# Patient Record
Sex: FEMALE | Race: OTHER
[De-identification: names, ages, dates, MRNs, and addresses within clinical notes are randomized per-mention and may not be internally consistent; named-entity substitution may affect disease eponyms.]

---

## 2020-08-11 ENCOUNTER — HOSPITAL ENCOUNTER (OUTPATIENT)
Dept: HOSPITAL 47 - OR | Age: 80
LOS: 1 days | Discharge: HOME HEALTH | End: 2020-08-12
Attending: ORTHOPAEDIC SURGERY
Payer: MEDICARE

## 2020-08-11 VITALS — BODY MASS INDEX: 24 KG/M2

## 2020-08-11 DIAGNOSIS — Z80.9: ICD-10-CM

## 2020-08-11 DIAGNOSIS — Z90.49: ICD-10-CM

## 2020-08-11 DIAGNOSIS — Z82.49: ICD-10-CM

## 2020-08-11 DIAGNOSIS — Z86.19: ICD-10-CM

## 2020-08-11 DIAGNOSIS — M17.12: Primary | ICD-10-CM

## 2020-08-11 DIAGNOSIS — M25.762: ICD-10-CM

## 2020-08-11 DIAGNOSIS — Z86.12: ICD-10-CM

## 2020-08-11 DIAGNOSIS — Z97.3: ICD-10-CM

## 2020-08-11 DIAGNOSIS — Z87.828: ICD-10-CM

## 2020-08-11 DIAGNOSIS — R26.81: ICD-10-CM

## 2020-08-11 DIAGNOSIS — Z83.3: ICD-10-CM

## 2020-08-11 DIAGNOSIS — Z98.890: ICD-10-CM

## 2020-08-11 DIAGNOSIS — Z90.710: ICD-10-CM

## 2020-08-11 DIAGNOSIS — H91.90: ICD-10-CM

## 2020-08-11 PROCEDURE — 97110 THERAPEUTIC EXERCISES: CPT

## 2020-08-11 PROCEDURE — 64448 NJX AA&/STRD FEM NRV NFS IMG: CPT

## 2020-08-11 PROCEDURE — 73560 X-RAY EXAM OF KNEE 1 OR 2: CPT

## 2020-08-11 PROCEDURE — 76942 ECHO GUIDE FOR BIOPSY: CPT

## 2020-08-11 PROCEDURE — 27447 TOTAL KNEE ARTHROPLASTY: CPT

## 2020-08-11 PROCEDURE — 88300 SURGICAL PATH GROSS: CPT

## 2020-08-11 PROCEDURE — 97161 PT EVAL LOW COMPLEX 20 MIN: CPT

## 2020-08-11 PROCEDURE — 85025 COMPLETE CBC W/AUTO DIFF WBC: CPT

## 2020-08-11 PROCEDURE — 80053 COMPREHEN METABOLIC PANEL: CPT

## 2020-08-11 RX ADMIN — CEFAZOLIN SCH MLS/HR: 330 INJECTION, POWDER, FOR SOLUTION INTRAMUSCULAR; INTRAVENOUS at 21:01

## 2020-08-11 RX ADMIN — POTASSIUM CHLORIDE SCH MLS: 14.9 INJECTION, SOLUTION INTRAVENOUS at 14:10

## 2020-08-11 RX ADMIN — ASPIRIN 325 MG ORAL TABLET SCH MG: 325 PILL ORAL at 21:01

## 2020-08-11 RX ADMIN — POTASSIUM CHLORIDE SCH: 14.9 INJECTION, SOLUTION INTRAVENOUS at 18:13

## 2020-08-11 NOTE — XR
EXAMINATION TYPE: XR knee limited LT

 

DATE OF EXAM: 8/11/2020

 

CLINICAL HISTORY: Left knee pain and arthritis status post total knee replacement.

 

TECHNIQUE:  Portable AP and crosstable lateral views of the left knee are obtained immediately postop
eratively.

 

COMPARISON: None

 

FINDINGS:  Native osseous structures somewhat demineralized. Metallic hardware from total left knee a
rthroplasty is seen and appears satisfactory in alignment and position.  There is evidence of recent 
surgery with diffuse subcutaneous gas and soft tissue swelling noted. 

 

 

IMPRESSION:  METALLIC HARDWARE FROM TOTAL LEFT KNEE ARTHROPLASTY IS SATISFACTORY IN ALIGNMENT.

## 2020-08-11 NOTE — P.OP
Date of Procedure: 08/11/20


Preoperative Diagnosis: 


Severe osteoarthritis left knee


Postoperative Diagnosis: 


Severe osteoarthritis left knee


Procedure(s) Performed: 


Left total knee arthroplasty


Implants: 


Smith and Nephew Journey II CR Oxinium cruciate retaining femoral component size

4, left


Smith & Nephew Journey left nonporous tibial baseplate size 3


Smith [employer] Nephew Journey II, XLPE Deep Dished articular insert, size13 mm, Size 3-

4 left


[employer] Nephew Journey BCS resurfacing oval patellar component, 29 mm


All components were cemented using Palacos R bone cement..


The articulation is Oxinium on polyethylene.


Anesthesia: spinal


Surgeon: Kris Childers


Assistant #1: Linh Bhatia


Estimated Blood Loss (ml): 30


Pathology: other (Bone and cartilage)


Condition: stable


Disposition: PACU


Indications for Procedure: 


After failure of conservative treatment we discussed the surgical and 

nonsurgical treatment options at length.  Patient wishes to proceed with a total

knee arthroplasty.  Complications specific to this procedure were discussed at 

length, including but not limited to infection, bleeding, stiffness, and nerve 

injury.   Covid-19 was also discussed at length with the patient, and they are 

aware of the current policies and procedures.  The patient was given the option 

of delaying surgery, but they elect to proceed knowing these risks.  Patient is 

aware of all these complications and informed consent was obtained


Operative Findings: 


The operative findings are consistent with severe osteoarthritis of the left 

knee


Description of Procedure: 


Patient was seen in the preoperative area consent was reviewed and operative 

site was marked with a skin marker.  An adductor canal pain catheter was placed 

by anesthesia in the preoperative area.  Patient was then brought to the 

operating room and given preoperative antibiotics intravenously. A spinal 

anesthetic was administered by the anesthesia department.  A tourniquet was 

placed on the upper thigh and the lower extremity was prepped and draped in 

usual sterile fashion.  A gram of transexamic acid was given.  A universal 

timeout was then performed which confirmed the patient's name, surgical site, 

ALLERGIES, and consent.





The lower extremity was then exsanguinated and tourniquet was inflated to 250 

mmHg.  A standard and anterior midline approach to the knee was performed.  The 

skin and subcutaneous tissue was dissected down to the patellar tendon.  A 

medial parapatellar arthrotomy was then performed.  The knee was then extended, 

the patellar was everted, and the knee was again flexed.  The patellar fat pad 

was removed in order to enhance exposure.  Anterior horns of both menisci were 

excised, and a release was performed to the posterior medial aspect of the knee.

 On gross visual inspection, there was complete loss of articular cartilage in 

the medial and patellofemoral joint spaces.  There was also significant 

cartilage damage in the lateral compartment.  There were multiple periarticular 

osteophytes which were then removed with a Ronguer.  The femoral canal was then 

opened with the 9.5 mm intramedullary drill.  The 8 mm intramedullary jair was 

then inserted into the femoral canal.  The distal femoral cutting guide was then

placed and set for 5 of valgus.  The distal femoral cutting block was then 

pinned in place.  The intramedullary jair was then removed, and the distal femur 

was then cut.  The cutting block was then removed and the cut was checked for 

symmetry.  Next, the sizing guide was then placed and set for 3 external 

rotation based off of the epicondylar axis and Whitesides line.  Pins were then 

placed and the drill holes, and the femur was sized with the sizing stylus.  The

pins were then removed, and the sizing guide was then removed.  The spikes of 

the femoral block was then placed into the predrilled holes, and malleted into 

place.  Two 45 mm pins were then placed into the fixation holes on the cutting 

block.  An harry wing was then used to ensure there would be no notching with 

the anterior cut.  The anterior condyles were cut without notching.  The 

anterior cord cut was then performed, followed by the posterior cut, posterior 

chamfer cut, and the anterior chamfer cut.  The collateral ligaments were 

protected during the entire process.  The cutting block was then removed, and 

the femoral canal was plugged with autologous bone.





Attention was then directed to the tibia.  The remaining ACL was removed with a 

Ronguer, and the tibia was then gently subluxed forward with a large bent knee 

retractor.  Any remaining menisci was excised.  The posterior lateral corner was

cauterized in order to cauterize the lateral geniculate artery.  The extra 

medullary tibial cutting guide was then placed, set for the appropriate 

rotation, slope, and depth of resection.  The proximal tibia cutting guide was 

then pinned in place.  Proximal tibia was then cut and sized.  Next trials were 

then placed with the appropriate-sized insert.  The knee was able to fully 

extend and flex to 130 and was stable throughout all range of motion.  The knee

was then extended, patella everted.  Patella was then measured, and then using 

an osteotomy guide, the patella was cut at the appropriate level.  The patella 

was then measured and drilled and the patella trial was then placed.  The knee 

was then taken through range of motion with the patella trial and the patella 

tracked normally.  The knee was then extended patella trial was then removed and

the patella was everted.  Knee was then flexed and lug holes were drilled 

through the femoral trial and the femoral trial was then removed.  The tibial 

was then exposed, and the tibial broach guide was then pinned in place after it 

was set for the appropriate rotation to allow for the most coverage without 

overhang.  The tibia was then reamed and broached.





The cut surfaces of bone were then irrigated with pulsatile lavage.  The 

posterior structures were injected with the ropivacaine solution.  The knee was 

also irrigated with Irrisept solution.  The components were then opened, the 

cement was mixed, and the components were then cemented in place.  The cement 

was allowed to harden with the knee in full extension.  While the cement was 

hardening, the remaining soft tissues were then injected with a ropivacaine 

solution, which consisted of 246.25 mg of ropivacaine, 0.5 mg of epinephrine, 30

mg of Toradol, 80 g of clonidine, and 48.45 mL of sterile water, for a total of

100 mL of fluid injected. After the cemented hardened.  The tourniquet was 

released, and hemostasis was obtained.  A second gram of transexamic acid was 

given.  The knee was again irrigated.  The knee was again taken through range of

motion and found to be stable throughout all range of motion of 0-130, and the 

patella tracked normally.  The fascia was then closed with #2 strata fix suture.

 The subcutaneous tissue was closed with 3-0 Vicryl and 3-0 strata fix.  

Dermabond glue was used for the skin and placed with the knee in flexion. The p

atient was placed in a sterile silver dressing.  Patient was then transferred to

recovery room in stable condition.





The assistant SARA Roberts was required due the complexity surgery and the 

need for a skilled surgical assistant.  She assisted in positioning, draping, 

retraction, and closure of the wound.

## 2020-08-12 VITALS — SYSTOLIC BLOOD PRESSURE: 125 MMHG | TEMPERATURE: 98.7 F | RESPIRATION RATE: 16 BRPM | DIASTOLIC BLOOD PRESSURE: 67 MMHG

## 2020-08-12 VITALS — HEART RATE: 64 BPM

## 2020-08-12 LAB
ALBUMIN SERPL-MCNC: 3.8 G/DL (ref 3.5–5)
ALP SERPL-CCNC: 81 U/L (ref 38–126)
ALT SERPL-CCNC: 18 U/L (ref 4–34)
ANION GAP SERPL CALC-SCNC: 10 MMOL/L
AST SERPL-CCNC: 31 U/L (ref 14–36)
BASOPHILS # BLD AUTO: 0 K/UL (ref 0–0.2)
BASOPHILS NFR BLD AUTO: 0 %
BUN SERPL-SCNC: 10 MG/DL (ref 7–17)
CALCIUM SPEC-MCNC: 9 MG/DL (ref 8.4–10.2)
CHLORIDE SERPL-SCNC: 105 MMOL/L (ref 98–107)
CO2 SERPL-SCNC: 24 MMOL/L (ref 22–30)
EOSINOPHIL # BLD AUTO: 0 K/UL (ref 0–0.7)
EOSINOPHIL NFR BLD AUTO: 0 %
ERYTHROCYTE [DISTWIDTH] IN BLOOD BY AUTOMATED COUNT: 4.08 M/UL (ref 3.8–5.4)
ERYTHROCYTE [DISTWIDTH] IN BLOOD: 13.6 % (ref 11.5–15.5)
GLUCOSE SERPL-MCNC: 186 MG/DL (ref 74–99)
HCT VFR BLD AUTO: 38.2 % (ref 34–46)
HGB BLD-MCNC: 11.8 GM/DL (ref 11.4–16)
LYMPHOCYTES # SPEC AUTO: 1.2 K/UL (ref 1–4.8)
LYMPHOCYTES NFR SPEC AUTO: 7 %
MCH RBC QN AUTO: 29.1 PG (ref 25–35)
MCHC RBC AUTO-ENTMCNC: 31 G/DL (ref 31–37)
MCV RBC AUTO: 93.7 FL (ref 80–100)
MONOCYTES # BLD AUTO: 0.5 K/UL (ref 0–1)
MONOCYTES NFR BLD AUTO: 3 %
NEUTROPHILS # BLD AUTO: 14.4 K/UL (ref 1.3–7.7)
NEUTROPHILS NFR BLD AUTO: 89 %
PLATELET # BLD AUTO: 197 K/UL (ref 150–450)
POTASSIUM SERPL-SCNC: 4.2 MMOL/L (ref 3.5–5.1)
PROT SERPL-MCNC: 6.2 G/DL (ref 6.3–8.2)
SODIUM SERPL-SCNC: 139 MMOL/L (ref 137–145)
WBC # BLD AUTO: 16.1 K/UL (ref 3.8–10.6)

## 2020-08-12 RX ADMIN — CEFAZOLIN SCH: 330 INJECTION, POWDER, FOR SOLUTION INTRAMUSCULAR; INTRAVENOUS at 08:41

## 2020-08-12 RX ADMIN — ASPIRIN 325 MG ORAL TABLET SCH MG: 325 PILL ORAL at 08:39

## 2020-08-12 NOTE — P.PN
Subjective


Progress Note Date: 08/12/20





No new complaints today.





Objective





- Vital Signs


Vital signs: 


                                   Vital Signs











Temp  98.7 F   08/12/20 07:00


 


Pulse  64   08/12/20 07:00


 


Resp  16   08/12/20 07:00


 


BP  125/67   08/12/20 07:00


 


Pulse Ox  95   08/12/20 07:00








                                 Intake & Output











 08/11/20 08/12/20 08/12/20





 18:59 06:59 18:59


 


Intake Total 750 222 


 


Output Total 30 400 


 


Balance 720 -178 


 


Weight 55.9 kg  


 


Intake:   


 


    


 


  Oral  222 


 


Output:   


 


  Urine  400 


 


  Estimated Blood Loss 30  


 


Other:   


 


  Voiding Method  Toilet Toilet


 


  # Voids  1 














- Exam





Gen: awake, alert


HEENT: normocephalic, atraumatic, good hearing acuity, moist mucous membranes


Resp: CTAB, good air exchange, no accessory muscle use, no wheezes, crackles, 

rhonchi


CVS: good distal perfusion x 4, RRR, no murmurs, clicks, gallops


GI: soft, NTTP, ND


: no SPT, no CVAT, parra catheter not present


MSK: no pitting edema, no clubbing


Neuro: non-focal, no sensory deficits, appropriate tone


Psych: cooperative, euthymic mood





- Labs


CBC & Chem 7: 


                                 08/12/20 08:17





                                 08/12/20 08:17


Labs: 


                  Abnormal Lab Results - Last 24 Hours (Table)











  08/12/20 08/12/20 Range/Units





  08:17 08:17 


 


WBC  16.1 H   (3.8-10.6)  k/uL


 


Neutrophils #  14.4 H   (1.3-7.7)  k/uL


 


Glucose   186 H  (74-99)  mg/dL


 


Total Protein   6.2 L  (6.3-8.2)  g/dL














Assessment and Plan


Assessment: 





Left total knee arthroplasty postoperative day 0


Pain control and DVT prophylaxis postoperatively managed by orthopedics





Salvatore for discharge from medicine perspective.


Monitor vital signs





Thank you for allowing us to participate in the care of this patient.    Do not 

hesitate to contact us with questions.  Someone can be reached from the [facility] hospitalist group at all hours of the day at [phone].

## 2020-08-12 NOTE — P.DS
Providers


Expected date of discharge: 08/12/20


Attending physician: 


Kris Childers





Consults: 





                                        





08/11/20 12:50


Consult Physician Routine 


   Consulting Provider: Iris Gordon


   Consult Reason/Comments: medical management


   Do you want consulting provider notified?: Yes











Primary care physician: 


Martina Leal








- Discharge Diagnosis(es)


(1) Osteoarthritis of left knee


Current Visit: Yes   Status: Acute   





(2) S/P total knee arthroplasty


Current Visit: Yes   Status: Acute   


Hospital Course: 


This is a 80-year-old female with known history of degenerative arthritis of the

left knee.  The patient presents for evaluation.  After discussion and 

consideration patient elects to proceed with total knee arthroplasty.  The 

patient is seen preoperatively by Dr. Childers and medically cleared for surgery

by their primary care physician.





Patient is admitted to [facility] on 08/11/2020 for total knee 

arthroplasty.  The procedures performed without complication or sequelae.  The 

patient is doing well postoperatively.  Labs and vital signs are stable on day 

of discharge.





On day of discharge patient's knee incision is healing well.  There is minimal 

erythema.  There is no drainage noted at this time.  There is minimal soft 

tissue swelling to the knee.  Patient has full foot and ankle motion without 

difficulty or pain.  Calf is soft and nontender to palpation.  Neurovascular 

status to the left lower extremity is intact.  Patient is discharged home in goo

d condition.  Opioid start talking form is reviewed and signed at patient 

bedside.  Please see med rec for accurate list of home medications.








Plan - Discharge Summary


Discharge Rx Participant: Yes


New Discharge Prescriptions: 


New


   Aspirin 325 mg PO BID #60 tab


   HYDROcodone/APAP 5-325MG [Norco 5-325] 1 - 2 tab PO Q6HR PRN #48 tab


     PRN Reason: Pain


   Sennosides [Senokot] 2 tab PO DAILY PRN #60 tablet


     PRN Reason: Constipation





No Action


   Acetaminophen [Tylenol] 500 mg PO Q4-6H PRN


     PRN Reason: Pain


Discharge Medication List





Acetaminophen [Tylenol] 500 mg PO Q4-6H PRN 08/04/20 [History]


Aspirin 325 mg PO BID #60 tab 08/12/20 [Rx]


HYDROcodone/APAP 5-325MG [Norco 5-325] 1 - 2 tab PO Q6HR PRN #48 tab 08/12/20 

[Rx]


Sennosides [Senokot] 2 tab PO DAILY PRN #60 tablet 08/12/20 [Rx]








Follow up Appointment(s)/Referral(s): 


Kris Childers DO [Doctor of Osteopathic Medicine] - 2 Weeks


Activity/Diet/Wound Care/Special Instructions: 


Weightbearing as tolerated with a walker.


CPM 5-6h daily.


Leave dressing intact.  May be removed by home care nurse or by patient in 10 

days.


May shower with dressing on.


Recommend use of compression stockings daily until follow up to help prevent 

swelling and blood clots. May remove at night before sleeping. 


Please follow up with [facility] and call with any questions or 

concerns, [phone].





Discharge Disposition: HOME WITH HOME HEALTH SERVICES

## 2020-08-12 NOTE — P.PN
Progress Note - Text





8/12/20  655am


80-year-old female status post total knee replacement.  Patient seen and 

evaluated this morning for postop pain control, patient has an On-Q pump for 

pain control.  Patient has a VAS of 1 out of bed ambulating.  Plan to continue 

On-Q pump infusion

## 2020-08-12 NOTE — P.CONS
History of Present Illness





- Reason for Consult


Consult date: 08/11/20


Postoperative medical management


Requesting physician: Kris Childers





- Chief Complaint


left knee pain 





- History of Present Illness





80-year-old female with no significant past medical history





Patient comes in for scheduled left total knee arthroplasty due to long history 

of osteoarthritis failed outpatient therapy and conservative management to 

control her pain resulting and limited functional capacity due to severe pain.


She tolerated procedure well postoperatively she denies any chest pain or 

trouble breathing denies any dizziness lightheadedness headache.  She tolerated 

by mouth intake denies any nausea vomiting or abdominal pain.  Patient has 

ambulated postoperatively with the nurse and seemed to have went well





Review of Systems





 








Pertinent positives as noted in HPI. All other systems were reviewed and are 

negative


 





Past Medical History


Past Medical History: No Reported History


Additional Past Medical History / Comment(s): possible arthritis in knee , polio

at age 4


History of Any Multi-Drug Resistant Organisms: None Reported


Past Surgical History: Appendectomy, Hysterectomy


Additional Past Surgical History / Comment(s): left knee arthroscopic x2 , 

facial repair and  repair of head wound , right ankle surgery


Past Anesthesia/Blood Transfusion Reactions: No Reported Reaction


Past Psychological History: No Psychological Hx Reported


Smoking Status: Never smoker


Past Alcohol Use History: None Reported


Past Drug Use History: None Reported





- Past Family History


  ** Mother


Family Medical History: No Reported History





  ** Brother(s)


Family Medical History: Cancer





Medications and Allergies


                                Home Medications











 Medication  Instructions  Recorded  Confirmed  Type


 


Acetaminophen [Tylenol] 500 mg PO Q4-6H PRN 08/04/20 08/11/20 History








                                    Allergies











Allergy/AdvReac Type Severity Reaction Status Date / Time


 


No Known Allergies Allergy   Verified 08/11/20 13:53














Physical Exam


Vitals: 


                                   Vital Signs











  Temp Pulse Pulse Resp BP BP Pulse Ox


 


 08/11/20 18:00   63   16   126/56  100


 


 08/11/20 17:30   69   16   120/50  100


 


 08/11/20 17:15   73   16   108/51  96


 


 08/11/20 17:00   69   18   116/47  97


 


 08/11/20 16:45   76   18   97/46  92 L


 


 08/11/20 16:28  96.9 F L  83   14   118/62  98


 


 08/11/20 14:47    61  16  138/65   99


 


 08/11/20 14:02  97.4 F L   80  18  187/79   97








                                Intake and Output











 08/11/20 08/11/20 08/11/20





 06:59 14:59 22:59


 


Intake Total  600 150


 


Output Total   30


 


Balance  600 120


 


Intake:   


 


  IV  600 150


 


Output:   


 


  Estimated Blood Loss   30


 


Other:   


 


  Weight  55.9 kg 55.9 kg














Constitutional:          No acute distress, conversant, pleasant


Eyes:      Anicteric sclerae, moist conjunctiva,  


         Pupils equal round reactive to light





ENMT:      NC/AT


         Oropharynx clear, no erythema, or exudates





Neck:      Supple, FROM, no masses, or JVD


         No carotid bruits


         No thyromegaly





Lungs:      Clear to auscultation


         Clear to percussion


         Normal respiratory effort, no accessory muscle use 





Cardiovascular:      Heart regular in rate and rhythm, 


         No murmurs, gallops, or rubs


         No peripheral edema





Abdominal:       Soft


         Nontender, no guarding, rebound or rigidity


         Abdomen moving with respiration


         Normoactive bowel sounds


         No hepatomegaly, No splenomegaly


         No palpable mass 


         No abdominal wall hernia noted 





Skin:      Normal temperature, tone, texture, turgor


         No induration


         No subcutaneous nodules 


         No rash, lesions


         No ulcers





Extremities:      No digital cyanosis 


         No clubbing


         Pedal pulses intact and symmetrical


         Radial pulses intact and symmetrical 


         No calf tenderness 





Psychiatric:      Alert and oriented to person, place and time


         Appropriate affect


         fair judgement   


      


Neuro      Muscles Strength 5/5 in all 4 extremities except for limited exam 

over the left lower extremity patient was able to move with limited range of 

motion due to surgical dressing postoperatively


         Sensation to light touch grossly present throughout


         Cranial nerves II-XII grossly intact


         No focal sensory deficits


Lymphatics:       no palpable cervical or supraclavicular , or inguinal lymph 

nodes  





Assessment and Plan


Assessment: 





Left total knee arthroplasty postoperative day 0


Pain control and DVT prophylaxis postoperatively managed by orthopedics





Follow-up renal function and CBC


Monitor vital signs





Thank you for allowing us to participate in the care of this patient.    Do not 

hesitate to contact us with questions.  Someone can be reached from the [facility] hospitalist group at all hours of the day at [phone].